# Patient Record
Sex: FEMALE | Race: WHITE | NOT HISPANIC OR LATINO
[De-identification: names, ages, dates, MRNs, and addresses within clinical notes are randomized per-mention and may not be internally consistent; named-entity substitution may affect disease eponyms.]

---

## 2017-07-13 ENCOUNTER — RX ONLY (RX ONLY)
Age: 67
End: 2017-07-13

## 2017-07-13 RX ORDER — OMEPRAZOLE 40 MG/1
1 CAPSULE CAPSULE, DELAYED RELEASE ORAL DAILY
Qty: 30 | Refills: 3 | Status: DISCONTINUED
Start: 2017-07-13 | End: 2017-11-08

## 2017-10-20 ENCOUNTER — RX ONLY (RX ONLY)
Age: 67
End: 2017-10-20

## 2017-10-20 RX ORDER — IBUPROFEN 800 MG/1
1 TABLET TABLET ORAL BID
Qty: 60 | Refills: 1 | Status: DISCONTINUED
Start: 2017-10-20 | End: 2018-04-03

## 2017-11-08 ENCOUNTER — RX ONLY (RX ONLY)
Age: 67
End: 2017-11-08

## 2017-11-08 RX ORDER — OMEPRAZOLE 40 MG/1
1 CAPSULE CAPSULE, DELAYED RELEASE ORAL DAILY
Qty: 30 | Refills: 3 | Status: DISCONTINUED
Start: 2017-11-08 | End: 2018-02-05

## 2018-02-05 ENCOUNTER — RX ONLY (RX ONLY)
Age: 68
End: 2018-02-05

## 2018-02-05 RX ORDER — OMEPRAZOLE 40 MG/1
1 CAPSULE CAPSULE, DELAYED RELEASE ORAL DAILY
Qty: 30 | Refills: 3 | Status: DISCONTINUED
Start: 2018-02-05 | End: 2018-02-05

## 2018-02-05 RX ORDER — OMEPRAZOLE 40 MG/1
1 CAPSULE CAPSULE, DELAYED RELEASE ORAL DAILY
Qty: 30 | Refills: 3
Start: 2018-02-05

## 2018-02-20 ENCOUNTER — OTHER- (OUTPATIENT)
Dept: URBAN - METROPOLITAN AREA CLINIC 9 | Facility: CLINIC | Age: 68
Setting detail: DERMATOLOGY
End: 2018-02-20

## 2018-02-20 ENCOUNTER — RX ONLY (RX ONLY)
Age: 68
End: 2018-02-20

## 2018-02-20 DIAGNOSIS — C44.619 BASAL CELL CARCINOMA OF SKIN OF LEFT UPPER LIMB, INCLUDING SHOULDER: ICD-10-CM

## 2018-02-20 PROCEDURE — 11100 BX SKIN SUBCUTANEOUS&/MUCOUS MEMBRANE 1 LESION: CPT

## 2018-02-20 RX ORDER — LEVOCETIRIZINE DIHYDROCHLORIDE 5 MG/1
1 TABLET TABLET ORAL DAILY
Qty: 90 | Refills: 3
Start: 2018-02-20

## 2018-03-08 ENCOUNTER — MOHS CONSULT (OUTPATIENT)
Dept: URBAN - METROPOLITAN AREA CLINIC 7 | Facility: CLINIC | Age: 68
Setting detail: DERMATOLOGY
End: 2018-03-08

## 2018-03-08 DIAGNOSIS — L57.0 ACTINIC KERATOSIS: ICD-10-CM

## 2018-03-08 PROBLEM — Z85828 V10.83: Status: ACTIVE | Noted: 2018-03-08

## 2018-03-08 PROCEDURE — 99213 OFFICE O/P EST LOW 20 MIN: CPT

## 2018-03-26 ENCOUNTER — RX ONLY (RX ONLY)
Age: 68
End: 2018-03-26

## 2018-03-26 RX ORDER — FLUOROURACIL 40 MG/G
1 APPLICATION CREAM TOPICAL AS DIRECTED
Qty: 40 | Refills: 1
Start: 2018-03-26

## 2018-04-03 ENCOUNTER — RX ONLY (RX ONLY)
Age: 68
End: 2018-04-03

## 2018-04-03 RX ORDER — IBUPROFEN 800 MG/1
1 TABLET TABLET ORAL BID
Qty: 60 | Refills: 1 | Status: DISCONTINUED
Start: 2018-04-03 | End: 2019-05-03

## 2018-04-10 ENCOUNTER — MOHS SURGERY-NO REPAIR (OUTPATIENT)
Dept: URBAN - METROPOLITAN AREA CLINIC 7 | Facility: CLINIC | Age: 68
Setting detail: DERMATOLOGY
End: 2018-04-10

## 2018-04-10 DIAGNOSIS — C44.212 BASAL CELL CARCINOMA OF SKIN OF RIGHT EAR AND EXTERNAL AURICULAR CANAL: ICD-10-CM

## 2018-04-10 PROCEDURE — 17312 MOHS ADDL STAGE: CPT

## 2018-04-10 PROCEDURE — 17311 MOHS 1 STAGE H/N/HF/G: CPT

## 2018-08-02 ENCOUNTER — RX ONLY (RX ONLY)
Age: 68
End: 2018-08-02

## 2018-08-02 RX ORDER — OMEPRAZOLE 40 MG/1
1 CAPSULE CAPSULE, DELAYED RELEASE ORAL DAILY
Qty: 90 | Refills: 1 | Status: DISCONTINUED
Start: 2018-08-02 | End: 2020-02-10

## 2018-08-07 ENCOUNTER — RX ONLY (RX ONLY)
Age: 68
End: 2018-08-07

## 2018-08-07 RX ORDER — TAZAROTENE 1 MG/G
1 APPLICATION AEROSOL, FOAM TOPICAL NIGHTLY
Qty: 100 | Refills: 3
Start: 2018-08-07

## 2018-09-20 ENCOUNTER — OTHER- (OUTPATIENT)
Dept: URBAN - METROPOLITAN AREA CLINIC 9 | Facility: CLINIC | Age: 68
Setting detail: DERMATOLOGY
End: 2018-09-20

## 2018-09-20 DIAGNOSIS — Z85.828 PERSONAL HISTORY OF OTHER MALIGNANT NEOPLASM OF SKIN: ICD-10-CM

## 2018-09-20 DIAGNOSIS — L71.8 OTHER ROSACEA: ICD-10-CM

## 2018-09-20 DIAGNOSIS — L82.1 OTHER SEBORRHEIC KERATOSIS: ICD-10-CM

## 2018-09-20 PROCEDURE — 11101 BIOPSY SKIN SUBQ&/MUCOUS MEMBRANE EA ADDL LESN: CPT

## 2018-09-20 PROCEDURE — 11100 BX SKIN SUBCUTANEOUS&/MUCOUS MEMBRANE 1 LESION: CPT

## 2018-12-04 ENCOUNTER — RX ONLY (RX ONLY)
Age: 68
End: 2018-12-04

## 2018-12-04 RX ORDER — VALACYCLOVIR HYDROCHLORIDE 1 G/1
2 TABLET TABLET, FILM COATED ORAL BID
Qty: 30 | Refills: 2
Start: 2018-12-04

## 2018-12-27 ENCOUNTER — RX ONLY (RX ONLY)
Age: 68
End: 2018-12-27

## 2018-12-27 RX ORDER — FAMCICLOVIR 500 MG/1
3 TABLET TABLET, FILM COATED ORAL DAILY
Qty: 21 | Refills: 2
Start: 2018-12-27

## 2019-05-03 ENCOUNTER — RX ONLY (RX ONLY)
Age: 69
End: 2019-05-03

## 2019-05-03 RX ORDER — IBUPROFEN 800 MG/1
1 TABLET TABLET ORAL BID
Qty: 60 | Refills: 1
Start: 2019-05-03

## 2019-07-02 ENCOUNTER — RX ONLY (RX ONLY)
Age: 69
End: 2019-07-02

## 2019-07-02 RX ORDER — IBUPROFEN 800 MG/1
1 TABLET TABLET ORAL BID
Qty: 90 | Refills: 3
Start: 2019-07-02

## 2019-07-30 ENCOUNTER — RX ONLY (RX ONLY)
Age: 69
End: 2019-07-30

## 2019-07-30 RX ORDER — PREDNISONE 20 MG/1
1 TABLET TABLET ORAL TID
Qty: 12 | Refills: 0
Start: 2019-07-30

## 2020-02-10 ENCOUNTER — RX ONLY (RX ONLY)
Age: 70
End: 2020-02-10

## 2020-02-10 RX ORDER — OMEPRAZOLE 40 MG/1
1 CAPSULE CAPSULE, DELAYED RELEASE ORAL DAILY
Qty: 30 | Refills: 11
Start: 2020-02-10

## 2020-04-30 ENCOUNTER — RX ONLY (RX ONLY)
Age: 70
End: 2020-04-30

## 2020-04-30 RX ORDER — FAMCICLOVIR 500 MG/1
3 TABLET TABLET, FILM COATED ORAL DAILY
Qty: 21 | Refills: 5
Start: 2020-04-30

## 2020-08-17 ENCOUNTER — RX ONLY (RX ONLY)
Age: 70
End: 2020-08-17

## 2020-08-17 RX ORDER — IBUPROFEN 800 MG/1
1 TABLET TABLET ORAL BID
Qty: 90 | Refills: 3
Start: 2020-08-17

## 2020-11-02 RX ORDER — TAZAROTENE 0.45 MG/G
1 APPLICATION LOTION TOPICAL NIGHTLY
Qty: 45 | Refills: 3
Start: 2020-11-02

## 2020-11-02 RX ORDER — TRIAMCINOLONE ACETONIDE 1 MG/G
1 APPLICATION CREAM TOPICAL BID
Qty: 30 | Refills: 0
Start: 2020-11-02

## 2020-11-02 RX ORDER — DICLOFENAC SODIUM 30 MG/G
1 APPLICATION GEL TOPICAL DAILY
Qty: 100 | Refills: 2
Start: 2020-11-02

## 2021-04-26 ENCOUNTER — RX ONLY (RX ONLY)
Age: 71
End: 2021-04-26

## 2021-04-26 RX ORDER — IBUPROFEN 800 MG/1
1 TABLET TABLET ORAL
Qty: 90 | Refills: 2
Start: 2021-04-26

## 2021-10-28 RX ORDER — DOXYCYCLINE HYCLATE 100 MG/1
1 CAPSULE CAPSULE, GELATIN COATED ORAL TWICE A DAY
Qty: 14 | Refills: 0
Start: 2021-10-28

## 2021-11-23 ENCOUNTER — OTHER- (OUTPATIENT)
Dept: URBAN - METROPOLITAN AREA CLINIC 9 | Facility: CLINIC | Age: 71
Setting detail: DERMATOLOGY
End: 2021-11-23

## 2021-11-23 DIAGNOSIS — L70.0 ACNE VULGARIS: ICD-10-CM

## 2021-11-23 DIAGNOSIS — D18.01 HEMANGIOMA OF SKIN AND SUBCUTANEOUS TISSUE: ICD-10-CM

## 2021-11-23 DIAGNOSIS — D22.5 MELANOCYTIC NEVI OF TRUNK: ICD-10-CM

## 2021-11-23 PROCEDURE — 17000 DESTRUCT PREMALG LESION: CPT

## 2021-11-23 PROCEDURE — 99213 OFFICE O/P EST LOW 20 MIN: CPT

## 2021-11-29 ENCOUNTER — RX ONLY (RX ONLY)
Age: 71
End: 2021-11-29

## 2021-11-29 RX ORDER — FLUTICASONE PROPIONATE 0.5 MG/G
CREAM TOPICAL
Qty: 30 | Refills: 2
Start: 2021-11-29

## 2021-11-29 RX ORDER — KETOCONAZOLE 20 MG/G
CREAM TOPICAL
Qty: 30 | Refills: 2
Start: 2021-11-29

## 2022-03-07 ENCOUNTER — FOLLOW-UP (OUTPATIENT)
Dept: URBAN - METROPOLITAN AREA CLINIC 9 | Facility: CLINIC | Age: 72
Setting detail: DERMATOLOGY
End: 2022-03-07

## 2022-03-07 ENCOUNTER — RX ONLY (RX ONLY)
Age: 72
End: 2022-03-07

## 2022-03-07 DIAGNOSIS — L82.1 OTHER SEBORRHEIC KERATOSIS: ICD-10-CM

## 2022-03-07 DIAGNOSIS — D22.5 MELANOCYTIC NEVI OF TRUNK: ICD-10-CM

## 2022-03-07 DIAGNOSIS — B35.1 TINEA UNGUIUM: ICD-10-CM

## 2022-03-07 PROCEDURE — 11102 TANGNTL BX SKIN SINGLE LES: CPT

## 2022-03-07 PROCEDURE — 99214 OFFICE O/P EST MOD 30 MIN: CPT

## 2022-03-07 RX ORDER — BETAMETHASONE DIPROPIONATE 0.5 MG/G
A SMALL AMOUNT OINTMENT TOPICAL TWICE A DAY
Qty: 45 | Refills: 0
Start: 2022-03-07

## 2022-03-07 RX ORDER — FAMCICLOVIR 500 MG/1
1 TABLET TABLET, FILM COATED ORAL THREE TIMES A DAY
Qty: 30 | Refills: 2
Start: 2022-03-07

## 2022-03-07 RX ORDER — IBUPROFEN 800 MG/1
1 TABLET TABLET ORAL
Qty: 90 | Refills: 2
Start: 2022-03-07

## 2022-03-11 ENCOUNTER — RX ONLY (RX ONLY)
Age: 72
End: 2022-03-11

## 2022-03-11 PROBLEM — L259 692.9: Status: ACTIVE | Noted: 2022-03-11

## 2022-03-11 RX ORDER — SILVER SULFADIAZINE 10 MG/G
AS DIRECTED CREAM TOPICAL EVERY NIGHT
Qty: 50 | Refills: 1
Start: 2022-03-11

## 2023-12-07 ENCOUNTER — APPOINTMENT (OUTPATIENT)
Dept: URBAN - METROPOLITAN AREA CLINIC 214 | Age: 73
Setting detail: DERMATOLOGY
End: 2023-12-07

## 2023-12-07 DIAGNOSIS — L98419 CHRONIC ULCER OF OTHER SPECIFIED SITES: ICD-10-CM

## 2023-12-07 DIAGNOSIS — L58.9 RADIODERMATITIS, UNSPECIFIED: ICD-10-CM

## 2023-12-07 DIAGNOSIS — L98429 CHRONIC ULCER OF OTHER SPECIFIED SITES: ICD-10-CM

## 2023-12-07 PROBLEM — L98.491 NON-PRESSURE CHRONIC ULCER OF SKIN OF OTHER SITES LIMITED TO BREAKDOWN OF SKIN: Status: ACTIVE | Noted: 2023-12-07

## 2023-12-07 PROCEDURE — OTHER MIPS QUALITY: OTHER

## 2023-12-07 PROCEDURE — OTHER BIOPSY BY PUNCH METHOD: OTHER

## 2023-12-07 PROCEDURE — 99213 OFFICE O/P EST LOW 20 MIN: CPT | Mod: 25

## 2023-12-07 PROCEDURE — OTHER PRESCRIPTION: OTHER

## 2023-12-07 PROCEDURE — OTHER COUNSELING: OTHER

## 2023-12-07 PROCEDURE — OTHER ORDER TESTS: OTHER

## 2023-12-07 PROCEDURE — 11104 PUNCH BX SKIN SINGLE LESION: CPT

## 2023-12-07 RX ORDER — TACROLIMUS 1 MG/G
OINTMENT TOPICAL
Qty: 60 | Refills: 0 | Status: ERX | COMMUNITY
Start: 2023-12-07

## 2023-12-07 ASSESSMENT — LOCATION ZONE DERM
LOCATION ZONE: TRUNK
LOCATION ZONE: FACE

## 2023-12-07 ASSESSMENT — LOCATION DETAILED DESCRIPTION DERM
LOCATION DETAILED: LEFT MEDIAL SUPERIOR CHEST
LOCATION DETAILED: LEFT INFERIOR FOREHEAD

## 2023-12-07 ASSESSMENT — LOCATION SIMPLE DESCRIPTION DERM
LOCATION SIMPLE: CHEST
LOCATION SIMPLE: LEFT FOREHEAD

## 2023-12-07 NOTE — PROCEDURE: ORDER TESTS
Performing Laboratory: [id number]
Bill For Surgical Tray: no
Billing Type: Third-Party Bill
Expected Date Of Service: 12/07/2023

## 2023-12-07 NOTE — PROCEDURE: BIOPSY BY PUNCH METHOD
Detail Level: Detailed
Was A Bandage Applied: Yes
Punch Size In Mm: 5
Size Of Lesion In Cm (Optional): 0
Depth Of Punch Biopsy: dermis
Biopsy Type: H and E
Anesthesia Type: 1% lidocaine with epinephrine
Anesthesia Volume In Cc: 0.5
Hemostasis: None
Epidermal Sutures: 4-0 Vicryl Rapide
Wound Care: Petrolatum
Dressing: bandage
Patient Will Remove Sutures At Home?: No
Lab: [id number]
Consent: Written consent was obtained and risks were reviewed including but not limited to scarring, infection, bleeding, scabbing, incomplete removal, nerve damage and allergy to anesthesia.
Post-Care Instructions: I reviewed with the patient in detail post-care instructions. Patient is to keep the biopsy site dry overnight, and then apply aquaphor or vaseline twice daily until healed.
Home Suture Removal Text: Sutures will dissolve on their own.  If they have any questions or difficulties they will call the office.
Notification Instructions: Patient will be notified of biopsy results. However, patient instructed to call the office if not contacted within 2 weeks.
Billing Type: Third-Party Bill
Information: Selecting Yes will display possible errors in your note based on the variables you have selected. This validation is only offered as a suggestion for you. PLEASE NOTE THAT THE VALIDATION TEXT WILL BE REMOVED WHEN YOU FINALIZE YOUR NOTE. IF YOU WANT TO FAX A PRELIMINARY NOTE YOU WILL NEED TO TOGGLE THIS TO 'NO' IF YOU DO NOT WANT IT IN YOUR FAXED NOTE.

## 2023-12-14 ENCOUNTER — RX ONLY (RX ONLY)
Age: 73
End: 2023-12-14

## 2023-12-14 RX ORDER — TRIAMCINOLONE ACETONIDE 1 MG/G
SMALL AMOUNT CREAM TOPICAL TWICE DAILY
Qty: 454 | Refills: 0 | Status: ERX | COMMUNITY
Start: 2023-12-14

## 2023-12-15 ENCOUNTER — RX ONLY (RX ONLY)
Age: 73
End: 2023-12-15